# Patient Record
Sex: MALE | Race: WHITE | NOT HISPANIC OR LATINO | URBAN - METROPOLITAN AREA
[De-identification: names, ages, dates, MRNs, and addresses within clinical notes are randomized per-mention and may not be internally consistent; named-entity substitution may affect disease eponyms.]

---

## 2022-05-24 ENCOUNTER — EMERGENCY (EMERGENCY)
Facility: HOSPITAL | Age: 42
LOS: 1 days | Discharge: HOME | End: 2022-05-24
Attending: EMERGENCY MEDICINE | Admitting: EMERGENCY MEDICINE
Payer: COMMERCIAL

## 2022-05-24 VITALS
HEART RATE: 58 BPM | TEMPERATURE: 98 F | RESPIRATION RATE: 16 BRPM | SYSTOLIC BLOOD PRESSURE: 116 MMHG | OXYGEN SATURATION: 100 % | WEIGHT: 160.06 LBS | HEIGHT: 71 IN | DIASTOLIC BLOOD PRESSURE: 73 MMHG

## 2022-05-24 VITALS
OXYGEN SATURATION: 99 % | DIASTOLIC BLOOD PRESSURE: 72 MMHG | SYSTOLIC BLOOD PRESSURE: 118 MMHG | HEART RATE: 61 BPM | RESPIRATION RATE: 19 BRPM

## 2022-05-24 PROCEDURE — 96375 TX/PRO/DX INJ NEW DRUG ADDON: CPT

## 2022-05-24 PROCEDURE — 99284 EMERGENCY DEPT VISIT MOD MDM: CPT | Mod: 25

## 2022-05-24 PROCEDURE — 96374 THER/PROPH/DIAG INJ IV PUSH: CPT

## 2022-05-24 PROCEDURE — 99284 EMERGENCY DEPT VISIT MOD MDM: CPT

## 2022-05-24 RX ORDER — EPINEPHRINE 0.3 MG/.3ML
0.3 INJECTION INTRAMUSCULAR; SUBCUTANEOUS
Qty: 2 | Refills: 0
Start: 2022-05-24

## 2022-05-24 RX ORDER — FAMOTIDINE 10 MG/ML
20 INJECTION INTRAVENOUS ONCE
Refills: 0 | Status: COMPLETED | OUTPATIENT
Start: 2022-05-24 | End: 2022-05-24

## 2022-05-24 RX ORDER — DIPHENHYDRAMINE HCL 50 MG
50 CAPSULE ORAL ONCE
Refills: 0 | Status: COMPLETED | OUTPATIENT
Start: 2022-05-24 | End: 2022-05-24

## 2022-05-24 RX ORDER — EPINEPHRINE 0.3 MG/.3ML
0.3 INJECTION INTRAMUSCULAR; SUBCUTANEOUS
Qty: 2 | Refills: 0
Start: 2022-05-24 | End: 2022-05-24

## 2022-05-24 RX ADMIN — FAMOTIDINE 20 MILLIGRAM(S): 10 INJECTION INTRAVENOUS at 12:22

## 2022-05-24 RX ADMIN — Medication 50 MILLIGRAM(S): at 12:21

## 2022-05-24 RX ADMIN — Medication 125 MILLIGRAM(S): at 12:21

## 2022-05-24 NOTE — ED PROVIDER NOTE - OBJECTIVE STATEMENT
pt with hx tree nut allergies c/o allergic reaction s/p drinking protein shake approx 45 min pta. pt relates within 10 min of drinking shake he developed diffuse itchy rash, cough, sob, felt throat tightening, took an epipen jr that friend had approx 10 min after symptoms started with resolution of symptoms. no cp, sob, abd pain, vomiting. no current cough, rash, itching, throat swelling.  [place]

## 2022-05-24 NOTE — ED PROVIDER NOTE - PROGRESS NOTE DETAILS
Reevaluated patient at bedside.  Patient feeling well, no symptoms.  An opportunity to ask questions was given.  Discussed the importance of prompt, close medical follow-up.  Patient will return with any changes, concerns or persistent / worsening symptoms.  Understanding of all instructions verbalized.

## 2022-05-24 NOTE — ED ADULT TRIAGE NOTE - CHIEF COMPLAINT QUOTE
"" I had an allergic reaction to a protein shake I drank about an hr ago, I started itching, throat closing up, I couldn't breathe, my co worker gave me Epi pen and injected myself with it, it's getting better now "

## 2022-05-24 NOTE — ED ADULT TRIAGE NOTE - NS ED TRIAGE AVPU SCALE
Alert-The patient is alert, awake and responds to voice. The patient is oriented to time, place, and person. The triage nurse is able to obtain subjective information. 36.6

## 2022-05-24 NOTE — ED PROVIDER NOTE - PATIENT PORTAL LINK FT
You can access the FollowMyHealth Patient Portal offered by [facility] by registering at the following website: [email or web address]. By joining [employer]’s FollowMyHealth portal, you will also be able to view your health information using other applications (apps) compatible with our system.

## 2022-05-24 NOTE — ED PROVIDER NOTE - CLINICAL SUMMARY MEDICAL DECISION MAKING FREE TEXT BOX
pt with allergic reaction, with cough, rash, itching, throat tightness, improved after taking epipen jr. plan - benadryl/pepcid/solumedrol/observe

## 2022-05-24 NOTE — ED ADULT NURSE NOTE - OBJECTIVE STATEMENT
patient is aaox3, has c/o allergic reaction to protein drink had earlier, had epipen after the event, has no c/o allergic reaction at this time, IV accessed and tolerating. medications given. will continue to monitor.

## 2022-05-24 NOTE — ED PROVIDER NOTE - ENMT, MLM
Airway patent. Mouth with normal mucosa. Throat has no vesicles, no oropharyngeal exudates and uvula is midline. no swelling of face, lips mouth tongue or throat

## 2023-03-31 NOTE — ED ADULT NURSE NOTE - NS ED PATIENT SAFETY CONCERN
Medications  Odalys Meng  [address] [facility]  [phone]  12:30pm  4/10/23 this is an in person appointment    Therapy  Andrea Pantoja  [address] [address]  [phone]  Please call and schedule appointment   No
